# Patient Record
Sex: FEMALE | Race: WHITE | Employment: UNEMPLOYED | URBAN - METROPOLITAN AREA
[De-identification: names, ages, dates, MRNs, and addresses within clinical notes are randomized per-mention and may not be internally consistent; named-entity substitution may affect disease eponyms.]

---

## 2024-10-08 ENCOUNTER — OFFICE VISIT (OUTPATIENT)
Dept: ORTHOPEDIC SURGERY | Age: 59
End: 2024-10-08

## 2024-10-08 DIAGNOSIS — M19.071 ARTHRITIS OF RIGHT ANKLE: Primary | ICD-10-CM

## 2024-10-08 NOTE — PROGRESS NOTES
Name: WILLIAM Rivera  YOB: 1965  Gender: female  MRN: [id number]    Summary: Chronic right ankle arthritis    Intra-articular right ankle injection provided today with cortisone    Follow-up with x-rays of the right ankle to discuss injection results versus prominent medial ankle hardware     CC: right ankle pain    HPI: WILLIAM Rivera is a 58 y.o. female presents today to establish care for workers compensation. Patient has had a series of surgeries performed on this ankle in the past. She is status post remote right ankle lateral ligament reconstruction and peroneus brevis tendon repair in 2007. She has continued pain and swelling in her right ankle that affects her activities of daily living. She has been performing physical therapy off and on for the last several years. She has been on medications, such as Celebrex and Lyrica, as well. She has been established for workers compensation with a provider in [place] but wants to be established locally. She presents today to establish care.     10/8-she continues to have pain over the medial ankle.  She describes sensitivity swelling and pain that limits her.  She primary points with medial ankle also the lateral ankle.  She states she is wearing her brace wearing her inserts with trying to modify her activities but she still has swelling and pain    ROS/Meds/PSH/PMH/FH/SH:   Patient Denies fever/chills, headache, visual changes, chest pain, shortness of breath, and nausea/vomiting/diarrhea     Tobacco:  reports that she has never smoked. She has never used smokeless tobacco.  No results found for: \"LABA1C\"    Physical Examination:  Gen: AAOx3 NAD    right lower:  Moderate edema noted at ankle.  No open wounds.  No signs of infection.  Pain w/ active ROM and passive ROM at the ankle.  TTP at the anterior lateral and medial ankle joint.  There is a mild amount of crepitance noted with ROM that is painful.  FHL/EHL/AT/GS intact.  There is a noted

## 2024-10-24 ENCOUNTER — OFFICE VISIT (OUTPATIENT)
Dept: ORTHOPEDIC SURGERY | Age: 59
End: 2024-10-24

## 2024-10-24 ENCOUNTER — TELEPHONE (OUTPATIENT)
Dept: ORTHOPEDIC SURGERY | Age: 59
End: 2024-10-24

## 2024-10-24 DIAGNOSIS — M19.071 ARTHRITIS OF RIGHT ANKLE: Primary | ICD-10-CM

## 2024-10-24 RX ORDER — CELECOXIB 100 MG/1
100 CAPSULE ORAL DAILY
Qty: 60 CAPSULE | Refills: 1 | Status: SHIPPED | OUTPATIENT
Start: 2024-10-24

## 2024-10-24 NOTE — TELEPHONE ENCOUNTER
They received Celebrex 100mg #60 for one daily. She normally takes bid. They need to know which it should be. Please give her a call.

## 2024-10-24 NOTE — PROGRESS NOTES
Name: WILLIAM Rivera  YOB: 1965  Gender: female  MRN: [id number]    Summary: Chronic right ankle arthritis    A prescription for right ankle AFO with a hinge built-in customized inserts and extra-depth shoes ordered.  Due to her ankle arthritis and the nature of her medial ankle pain with some muscular imbalance I do think an AFO would provide varus and valgus stability while still allowing her to plantarflex and dorsiflex her ankle.  This built into her full-length customized inserts will be imperative to her walking.    Prescriptions for Celebrex and Lyrica.   [facility]erral to [facility] for lower extremity swelling       CC: right ankle pain    HPI: WILLIAM Rivera is a 58 y.o. female presents today to establish care for workers compensation. Patient has had a series of surgeries performed on this ankle in the past. She is status post remote right ankle lateral ligament reconstruction and peroneus brevis tendon repair in 2007. She has continued pain and swelling in her right ankle that affects her activities of daily living. She has been performing physical therapy off and on for the last several years. She has been on medications, such as Celebrex and Lyrica, as well. She has been established for workers compensation with a provider in [place] but wants to be established locally. She presents today to establish care.   10/24-she presents back today with continued medial ankle pain and ankle pain around the foot.  She states her inserts are wearing down.  The last inserts were provided were not made for her and she needs a new prescription.  She wants to be there is anything more the inserts that can help her.  Her main issue is weakness in the ankle, her foot turning in and ankle pain    ROS/Meds/PSH/PMH/FH/SH:   Patient Denies fever/chills, headache, visual changes, chest pain, shortness of breath, and nausea/vomiting/diarrhea     Tobacco:  reports that she has never smoked. She has never

## 2024-12-12 ENCOUNTER — TELEPHONE (OUTPATIENT)
Dept: ORTHOPEDIC SURGERY | Age: 59
End: 2024-12-12

## 2024-12-12 DIAGNOSIS — M54.40 ACUTE RIGHT-SIDED LOW BACK PAIN WITH SCIATICA, SCIATICA LATERALITY UNSPECIFIED: Primary | ICD-10-CM

## 2024-12-12 RX ORDER — METHYLPREDNISOLONE 4 MG/1
TABLET ORAL
Qty: 1 KIT | Refills: 0 | Status: SHIPPED | OUTPATIENT
Start: 2024-12-12 | End: 2024-12-18

## 2024-12-12 RX ORDER — CYCLOBENZAPRINE HCL 10 MG
10 TABLET ORAL 3 TIMES DAILY PRN
Qty: 30 TABLET | Refills: 1 | Status: SHIPPED | OUTPATIENT
Start: 2024-12-12 | End: 2025-01-11

## 2024-12-12 NOTE — TELEPHONE ENCOUNTER
She is asking for a call from Marli. She is having some sciatica issues. I explained she does not treat that but she says its the same leg and foot that Nicole treats so she wants to get her advice.

## 2024-12-17 ENCOUNTER — OFFICE VISIT (OUTPATIENT)
Dept: ORTHOPEDIC SURGERY | Age: 59
End: 2024-12-17
Payer: COMMERCIAL

## 2024-12-17 DIAGNOSIS — M51.361 DEGENERATION OF INTERVERTEBRAL DISC OF LUMBAR REGION WITH LOWER EXTREMITY PAIN: ICD-10-CM

## 2024-12-17 DIAGNOSIS — M47.816 LUMBAR SPONDYLOSIS: Primary | ICD-10-CM

## 2024-12-17 PROCEDURE — 99204 OFFICE O/P NEW MOD 45 MIN: CPT | Performed by: PHYSICIAN ASSISTANT

## 2024-12-17 RX ORDER — GABAPENTIN 300 MG/1
300 CAPSULE ORAL 3 TIMES DAILY
Qty: 90 CAPSULE | Refills: 2 | Status: SHIPPED | OUTPATIENT
Start: 2024-12-17 | End: 2025-03-17

## 2024-12-17 NOTE — PROGRESS NOTES
Name: WILLIAM Rivera  YOB: 1965  Gender: female  MRN: [id number]    CC:   Chief Complaint   Patient presents with    New Patient     Right sided lumbar spine pain with sciatica         HPI:   WILLIAM Rivera is a 59 y.o. female with a PMHx of former diabetic, no longer on medical management, BMI 41, other comorbidities below.      They present here for evaluation of lower back pain rating to the right leg and foot.  This been going on about 10 days.  It may have gotten aggravated after a long driving trip around [place] a few weeks ago, though she states she got out and walked around frequently at frequent stops..  Otherwise no specific injury or trauma at the onset.  But for the last week and a half she has had severe lower back pain rating to the right leg with numbness and tingling in her right foot.  Over the last week and a half or so she has had severe difficulty which is getting around her home and simply driving her car.  She cannot walk long distances because of the pain in her leg and foot.  Her daughter is driving her around because she does not feel confident using the gas pedal with that foot.  She has been taking Flexeril and she takes Celebrex.  She also recently finished an oral steroid taper yesterday, and she has been using ice and heat.  These have provided minimal improvement in her symptoms so far.  She is planning to leave town and go on a long trip in the coming days for the holidays.  She even tried some old hydrocodone from her previous ankle surgery.            Past Medical History Includes:   Past Medical History:   Diagnosis Date    Hypoglycemia     PONV (postoperative nausea and vomiting)    ,   Past Surgical History:   Procedure Laterality Date    ANKLE FRACTURE SURGERY Right 12/13/2023    SURGICAL FIXATION OF RIGHT ANKLE AND FIXATION OF FIFTH METATARSAL AND RIGHT ANKLE TENDON AND LIGAMENT REPAIR performed by Nigel Townsend MD at [facility]    BREAST LUMPECTOMY

## 2024-12-26 ENCOUNTER — TELEPHONE (OUTPATIENT)
Dept: ORTHOPEDIC SURGERY | Age: 59
End: 2024-12-26

## 2024-12-30 ENCOUNTER — TELEPHONE (OUTPATIENT)
Dept: ORTHOPEDIC SURGERY | Age: 59
End: 2024-12-30

## 2024-12-30 NOTE — TELEPHONE ENCOUNTER
She is calling to follow up on her MRI results. I told her I spoke with an MRI tech and was told they are behind again on reports but that hopefully it will be in the next 24 hours.

## 2025-01-03 NOTE — TELEPHONE ENCOUNTER
Called and spoke with pt. Pt was informed her MRI and treatment option will be discuss at her follow up on Monday 01/06/25 with Jose Angel. Pt has not been able to start PT yet. Pt voiced understanding.

## 2025-01-06 ENCOUNTER — OFFICE VISIT (OUTPATIENT)
Dept: ORTHOPEDIC SURGERY | Age: 60
End: 2025-01-06
Payer: COMMERCIAL

## 2025-01-06 VITALS — HEIGHT: 66 IN | BODY MASS INDEX: 40.98 KG/M2 | WEIGHT: 255 LBS

## 2025-01-06 DIAGNOSIS — M47.816 LUMBAR SPONDYLOSIS: Primary | ICD-10-CM

## 2025-01-06 DIAGNOSIS — M51.361 DEGENERATION OF INTERVERTEBRAL DISC OF LUMBAR REGION WITH LOWER EXTREMITY PAIN: ICD-10-CM

## 2025-01-06 PROCEDURE — 99214 OFFICE O/P EST MOD 30 MIN: CPT | Performed by: PHYSICIAN ASSISTANT

## 2025-01-06 NOTE — PROGRESS NOTES
01/06/25        Name: WILLIAM Rivera  YOB: 1965  Gender: female  MRN: [id number]        MRI/IMAGING/STUDY FOLLOWUP    CC: Follow-up (MRI results)       HPI: WILLIAM Rivera is a 59 y.o. female who returns for Follow-up (MRI results)           Patient presents to the office today for follow-up to review MRI results.  She does feel that the gabapentin is helping.          Meds/PSH/PMH/FH/SH: This information has been reviewed.      ALLERGIES:   Allergies   Allergen Reactions    Morphine Nausea And Vomiting              Physical Examination:            Imaging:         MRI Result (most recent):  MRI LUMBAR SPINE WO CONTRAST 12/23/2024    Narrative  MRI LUMBAR SPINE WO CONTRAST    INDICATION: Spondylosis without myelopathy or radiculopathy, lumbar region.  59-year-old female. No lumbar spine surgery reported. No acute trauma reported.    COMPARISON: Lumbar spine radiographs 12/17/2024    TECHNIQUE: Axial and sagittal T1-T2 weighted images along with coronal T2,  sagittal  IR and axial proton density sequences of the lumbar spine were  obtained without infusion of gadolinium.    FINDINGS:  For purposes of this dictation there are 5 nonrib-bearing lumbar vertebral  segments labeled L1-L5. At the vertebral segment just cranial there are  hypoplastic ribs/hyperplastic transverse processes. This is taken to be  T12/transitional segment for purposes of this dictation. Levocurvature. Lumbar  lordosis with trace retrolisthesis L5 on S1. No perched/subluxed facets. No pars  interarticularis defects.    Bone marrow signal intensity shows no acute fracture/acute compression fracture.  No discrete suspicious lytic or blastic lesion    Conus of normal signal intensity and morphology. Tip termination at the  T12/transitional segment level. No acute paraspinal muscle strain. Posterior  subcutaneous lumbar edema, nonspecific. No paravertebral soft tissue mass or  abnormal fluid collection. Question exophytic left renal

## 2025-01-07 ENCOUNTER — OFFICE VISIT (OUTPATIENT)
Dept: ORTHOPEDIC SURGERY | Age: 60
End: 2025-01-07
Payer: COMMERCIAL

## 2025-01-07 DIAGNOSIS — M19.071 ARTHRITIS OF RIGHT ANKLE: Primary | ICD-10-CM

## 2025-01-07 PROCEDURE — 99213 OFFICE O/P EST LOW 20 MIN: CPT | Performed by: PHYSICIAN ASSISTANT

## 2025-01-07 NOTE — PROGRESS NOTES
Name: WILLIAM Rivera  YOB: 1965  Gender: female  MRN: [id number]    Summary: Chronic right ankle arthritis    She will get fitted for her custom brace and inserts with   Continue Celebrex and gabapentin       CC: right ankle pain    HPI: WILLIAM Rivera is a 59 y.o. female presents today to establish care for workers compensation. Patient has had a series of surgeries performed on this ankle in the past. She is status post remote right ankle lateral ligament reconstruction and peroneus brevis tendon repair in 2007. She has continued pain and swelling in her right ankle that affects her activities of daily living. She has been performing physical therapy off and on for the last several years. She has been on medications, such as Celebrex and Lyrica, as well. She has been established for workers compensation with a provider in [place] but wants to be established locally. She presents today to establish care.   10/24-she presents back today with continued medial ankle pain and ankle pain around the foot.  She states her inserts are wearing down.  The last inserts were provided were not made for her and she needs a new prescription.  She wants to be there is anything more the inserts that can help her.  Her main issue is weakness in the ankle, her foot turning in and ankle pain    1/7/2025-patient presents today for repeat evaluation.  She states that she has not gotten her inserts, braces or shoes yet due to some issues with advanced prosthetics.  She would like to use  as that is what she used in [place].  Her main complaint at this time is her lower back with right lower extremity radiculopathy.  She states that she had an MRI and showed a large disc herniation impacting the S1 nerve root.  This is exacerbating her right foot drop.  She is having significant difficulty ambulating because of this.  She has an appointment to see Dr. Francois next week to discuss her

## 2025-01-14 ENCOUNTER — OFFICE VISIT (OUTPATIENT)
Age: 60
End: 2025-01-14
Payer: COMMERCIAL

## 2025-01-14 VITALS — WEIGHT: 256 LBS | BODY MASS INDEX: 41.14 KG/M2 | HEIGHT: 66 IN

## 2025-01-14 DIAGNOSIS — M51.27 HERNIATION OF INTERVERTEBRAL DISC BETWEEN L5 AND S1: Primary | ICD-10-CM

## 2025-01-14 PROCEDURE — 99204 OFFICE O/P NEW MOD 45 MIN: CPT | Performed by: ORTHOPAEDIC SURGERY

## 2025-01-14 NOTE — PROGRESS NOTES
Name: WILLIAM Rivera  YOB: 1965  Gender: female  MRN: [id number]  Age: 59 y.o.    Chief Complaint:   Chief Complaint   Patient presents with    New Patient     Low back pain with right leg numbness          History of Present Illness:      This is a very pleasant 59 y.o. female who presents with a 6-week history of low back and right lower extremity pain.  The patient reports that she had a mechanical fall down some steps in December 2023.  Ultimately this resulted in a broken right foot/ankle requiring surgery with Dr. Nigel portillo.  She has some permanent/persistent right ankle dorsiflexion weakness as a result of this.  She feels like as a result of the immobility and pain from this fall, she consequently developed some chronic lower back pain.  These have been largely well-tolerated until shortly before Thanksgiving, 2024 where she began having severe pain that radiated from the right hip down the right lower extremity.  She describes pain that seems to follow an S1 distribution with posterior buttock, posterior thigh, calf and foot pain.  She also has numbness in this distribution, particularly in the foot.  She has been taking Celebrex without much relief.  Her primary care provider also started her on muscle relaxer.  She recently saw Mr. Vladimir Escamilla and he provided a Medrol Dosepak as well as gabapentin.  She feels like the right leg feels very heavy.  The symptoms are worsening and she has been unable to find any meaningful relief.  She feels like her baseline right ankle weakness is getting slightly worse over the last 6 to 8 weeks.               Social History:  Social History     Tobacco Use    Smoking status: Never    Smokeless tobacco: Never   Substance Use Topics    Alcohol use: Not on file        What is your occupation? retired    Are you disabled? no    Who do you live with?  Spouse and children    Who referred you here?   Jose Angel Escamilla PA      Medications:  Prior to Visit

## 2025-01-16 ENCOUNTER — EVALUATION (OUTPATIENT)
Age: 60
End: 2025-01-16

## 2025-01-16 DIAGNOSIS — M62.81 MUSCLE WEAKNESS (GENERALIZED): ICD-10-CM

## 2025-01-16 DIAGNOSIS — M25.671 ANKLE STIFF, RIGHT: Primary | ICD-10-CM

## 2025-01-16 DIAGNOSIS — R26.2 DIFFICULTY WALKING DUE TO ANKLE AND FOOT JOINT: ICD-10-CM

## 2025-01-16 DIAGNOSIS — M79.604 LEG PAIN, LATERAL, RIGHT: ICD-10-CM

## 2025-01-16 DIAGNOSIS — M54.41 CHRONIC RIGHT-SIDED LOW BACK PAIN WITH RIGHT-SIDED SCIATICA: ICD-10-CM

## 2025-01-16 DIAGNOSIS — M25.571 PAIN IN RIGHT ANKLE AND JOINTS OF RIGHT FOOT: ICD-10-CM

## 2025-01-16 DIAGNOSIS — G89.29 CHRONIC RIGHT-SIDED LOW BACK PAIN WITH RIGHT-SIDED SCIATICA: ICD-10-CM

## 2025-01-16 NOTE — PROGRESS NOTES
flexors, Glut med, max, pelvic rotators, quad, hams  Soft Tissue Tightness: RIGHT > LEFT BILATERAL: Hip flexors, hams, gastroc soleus    AROM:  LUMBAR (within pain tolerance) COMMENTS   FLEX: (40 ° 60°)  25°  Pain and Tightness at EROM   EXT: (20 ° 35 °)  10°  Pain and Tightness at EROM    RIGHT LEFT    Side Bending (15 ° 20°)  10°  15°  Pain and Tightness at EROM   Rotation (3°- 18 ° )  5°  5°  Pain and Tightness at EROM   Thoracic SB  Limited  Limited    Thoracic ROT Limited Limited    Hip External Rot Limited Limited    Hip Internal Rot Limited Limited      GROSS MMT LUMBAR:  LUMBAR/HIP   (within pain tolerance)   COMMENTS   FLEXION 3/5  Pain with resistance   EXTENSION 3/5  Pain with resistance    RIGHT LEFT    SIDE BENDING 3/5 3/5 Pain with resistance   ROTATION 3/5 3/5 Pain with resistance    RIGHT LEFT    Hip EXTENSION 3/5 3/5 Pain with resistance   Hip ABDUCTION 3/5 3/5 Pain with resistance   Hip INTERNAL ROTATION 3/5 3/5 Pain with resistance   HIP EXTERNAL ROTATION 3/5 3/5 Pain with resistance     Palpation/Joint Mobility:   LUMBAR JOINT MOBILITY     COMMENTS   L1 hypomobile    L2 hypomobile       L3 hypomobile    L4 hypomobile    L5 hypomobile    [place] hypomobile    SACRUM  hypomobile    Hypertonic lumbar paraspinals  TTP to lumbosacral paraspinals     DERMATOME/MYOTOME:  WFL  REFLEXES: WNL  Neuro Screen: numbness to R leg , tingling to R leg , and radiating pain R leg (L5-S1 distribution  Bowel/bladder function: normal   Sensory/Abdominal/saddle sensation: normal    Function:   Gait: antalgic R  difficulty/unable to weight shift R  decreased heel strike R  decreased step height/length R  trendelenburg R .   Stair management:step-to leading left ascending, step-to leading right descending Per patient report.  Sit to stand: Guarded, Painful. Compensates with:B UE. Shift weight to LEFT    Special Tests:  SLR's Test:  Positive  Symptoms below 35 degrees: Disc herniation  Heel Walking (L4-L5):

## 2025-01-21 ENCOUNTER — TREATMENT (OUTPATIENT)
Age: 60
End: 2025-01-21
Payer: COMMERCIAL

## 2025-01-21 DIAGNOSIS — M62.81 MUSCLE WEAKNESS (GENERALIZED): ICD-10-CM

## 2025-01-21 DIAGNOSIS — R26.2 DIFFICULTY WALKING DUE TO ANKLE AND FOOT JOINT: ICD-10-CM

## 2025-01-21 DIAGNOSIS — M54.41 CHRONIC RIGHT-SIDED LOW BACK PAIN WITH RIGHT-SIDED SCIATICA: Primary | ICD-10-CM

## 2025-01-21 DIAGNOSIS — G89.29 CHRONIC RIGHT-SIDED LOW BACK PAIN WITH RIGHT-SIDED SCIATICA: Primary | ICD-10-CM

## 2025-01-21 DIAGNOSIS — M79.604 LEG PAIN, LATERAL, RIGHT: ICD-10-CM

## 2025-01-21 PROCEDURE — 97140 MANUAL THERAPY 1/> REGIONS: CPT | Performed by: PHYSICAL THERAPIST

## 2025-01-21 PROCEDURE — 97110 THERAPEUTIC EXERCISES: CPT | Performed by: PHYSICAL THERAPIST

## 2025-01-21 NOTE — PROGRESS NOTES
[facility]  [address] [place] [address]  Dept: [phone]      Physical Therapy Daily Note     Referring MD: Geovani Francois *  Diagnosis:     ICD-10-CM    1. Chronic right-sided low back pain with right-sided sciatica  M54.41     G89.29       2. Leg pain, lateral, right  M79.604       3. Muscle weakness (generalized)  M62.81       4. Difficulty walking due to ankle and foot joint  R26.2            Spine Surgery Date (if applicable):   Prior Spine Surgery: None    Prior Orthopedic Surgeries: Multiple R ankle surgeries   Therapy precautions:Fall risk and Osteopenia/osteoporosis  Co-morbidities affecting plan of care: None    Payor: Payor: [place] [employer] /  /  /     Billing pattern: Commercial- substantial/midpoint time each CPT  Total Timed Procedure Codes: 40 min   Total Time: 40 min    Modifier needed: No  Episode visit count:  2     PERTINENT MEDICAL HISTORY   Medications: reviewed in chart   Past medical and surgical history:   Past Medical History:   Diagnosis Date    Hypoglycemia     PONV (postoperative nausea and vomiting)       Past Surgical History:   Procedure Laterality Date    ANKLE FRACTURE SURGERY Right 12/13/2023    SURGICAL FIXATION OF RIGHT ANKLE AND FIXATION OF FIFTH METATARSAL AND RIGHT ANKLE TENDON AND LIGAMENT REPAIR performed by Nigel Townsend MD at [facility]    BREAST LUMPECTOMY      CHOLECYSTECTOMY      GASTRIC BYPASS SURGERY      HYSTEROPLASTY      TONSILLECTOMY       Allergies:   Allergies   Allergen Reactions    Morphine Nausea And Vomiting      Diagnostic exams (per chart review): MRI of the lumbar spine reviewed from December 23, 2024.  This reveals no high-grade central stenosis.  Visible levels foramina are largely patent on the right side.  On the left side there is similarly no areas of high-grade foraminal stenosis at any level.  There is an extruded disc fragment emanating from the L5-S1 disc space causing displacement of the right traversing

## 2025-01-23 ENCOUNTER — TREATMENT (OUTPATIENT)
Age: 60
End: 2025-01-23

## 2025-01-23 DIAGNOSIS — R26.2 DIFFICULTY WALKING DUE TO ANKLE AND FOOT JOINT: ICD-10-CM

## 2025-01-23 DIAGNOSIS — M25.671 ANKLE STIFF, RIGHT: ICD-10-CM

## 2025-01-23 DIAGNOSIS — M62.81 MUSCLE WEAKNESS (GENERALIZED): ICD-10-CM

## 2025-01-23 DIAGNOSIS — M79.604 LEG PAIN, LATERAL, RIGHT: ICD-10-CM

## 2025-01-23 DIAGNOSIS — M25.571 PAIN IN RIGHT ANKLE AND JOINTS OF RIGHT FOOT: ICD-10-CM

## 2025-01-23 DIAGNOSIS — G89.29 CHRONIC RIGHT-SIDED LOW BACK PAIN WITH RIGHT-SIDED SCIATICA: Primary | ICD-10-CM

## 2025-01-23 DIAGNOSIS — M54.41 CHRONIC RIGHT-SIDED LOW BACK PAIN WITH RIGHT-SIDED SCIATICA: Primary | ICD-10-CM

## 2025-01-23 NOTE — PROGRESS NOTES
[facility] [place] [facility]  [address] [place] [address]  Dept: [phone]      Physical Therapy Daily Note     Referring MD: Geovani Francois *  Diagnosis:     ICD-10-CM    1. Chronic right-sided low back pain with right-sided sciatica  M54.41     G89.29       2. Leg pain, lateral, right  M79.604       3. Muscle weakness (generalized)  M62.81       4. Difficulty walking due to ankle and foot joint  R26.2       5. Pain in right ankle and joints of right foot  M25.571       6. Ankle stiff, right  M25.671          Spine Surgery Date (if applicable):   Prior Spine Surgery: None    Prior Orthopedic Surgeries: Multiple R ankle surgeries   Therapy precautions:Fall risk and Osteopenia/osteoporosis  Co-morbidities affecting plan of care: None    Payor: Payor[place] /  /  /     Billing pattern: Commercial- substantial/midpoint time each CPT  Total Timed Procedure Codes: 40 min   Total Time: 40 min    Modifier needed: No  Episode visit count:  3     PERTINENT MEDICAL HISTORY   Medications: reviewed in chart   Past medical and surgical history:   Past Medical History:   Diagnosis Date    Hypoglycemia     PONV (postoperative nausea and vomiting)       Past Surgical History:   Procedure Laterality Date    ANKLE FRACTURE SURGERY Right 12/13/2023    SURGICAL FIXATION OF RIGHT ANKLE AND FIXATION OF FIFTH METATARSAL AND RIGHT ANKLE TENDON AND LIGAMENT REPAIR performed by Nigel Townsend MD at [facility]    BREAST LUMPECTOMY      CHOLECYSTECTOMY      GASTRIC BYPASS SURGERY      HYSTEROPLASTY      TONSILLECTOMY       Allergies:   Allergies   Allergen Reactions    Morphine Nausea And Vomiting      Diagnostic exams (per chart review): MRI of the lumbar spine reviewed from December 23, 2024.  This reveals no high-grade central stenosis.  Visible levels foramina are largely patent on the right side.  On the left side there is similarly no areas of high-grade foraminal stenosis at any level.  There is an

## 2025-01-24 ENCOUNTER — OFFICE VISIT (OUTPATIENT)
Dept: ORTHOPEDIC SURGERY | Age: 60
End: 2025-01-24

## 2025-01-24 DIAGNOSIS — R52 PAIN: Primary | ICD-10-CM

## 2025-01-24 DIAGNOSIS — G89.18 POST-OP PAIN: ICD-10-CM

## 2025-01-24 NOTE — PROGRESS NOTES
Name: WILLIAM Rivera  YOB: 1965  Gender: female  MRN: [id number]    Summary: Chronic right ankle arthritis    She will get fitted for her custom brace and inserts with     Custom full-length inserts built into a posterior AFO brace and compression stockings ordered.    Continue Celebrex and gabapentin       CC: right ankle pain    HPI: WILLIAM Rivera is a 59 y.o. female presents today to establish care for workers compensation. Patient has had a series of surgeries performed on this ankle in the past. She is status post remote right ankle lateral ligament reconstruction and peroneus brevis tendon repair in 2007. She has continued pain and swelling in her right ankle that affects her activities of daily living. She has been performing physical therapy off and on for the last several years. She has been on medications, such as Celebrex and Lyrica, as well. She has been established for workers compensation with a provider in [place] but wants to be established locally. She presents today to establish care.   10/24-she presents back today with continued medial ankle pain and ankle pain around the foot.  She states her inserts are wearing down.  The last inserts were provided were not made for her and she needs a new prescription.  She wants to be there is anything more the inserts that can help her.  Her main issue is weakness in the ankle, her foot turning in and ankle pain    1/7/2025-patient presents today for repeat evaluation.  She states that she has not gotten her inserts, braces or shoes yet due to some issues with advanced prosthetics.  She would like to use  as that is what she used in [place].  Her main complaint at this time is her lower back with right lower extremity radiculopathy.  She states that she had an MRI and showed a large disc herniation impacting the S1 nerve root.  This is exacerbating her right foot drop.  She is having significant difficulty ambulating because

## 2025-02-17 ENCOUNTER — TELEPHONE (OUTPATIENT)
Dept: ORTHOPEDIC SURGERY | Age: 60
End: 2025-02-17

## 2025-02-17 DIAGNOSIS — G89.18 POST-OP PAIN: ICD-10-CM

## 2025-02-17 DIAGNOSIS — R52 PAIN: Primary | ICD-10-CM

## 2025-02-17 RX ORDER — CELECOXIB 100 MG/1
100 CAPSULE ORAL DAILY
Qty: 60 CAPSULE | Refills: 3 | Status: SHIPPED | OUTPATIENT
Start: 2025-02-17

## 2025-02-18 ENCOUNTER — TELEPHONE (OUTPATIENT)
Dept: ORTHOPEDIC SURGERY | Age: 60
End: 2025-02-18

## 2025-02-18 RX ORDER — CELECOXIB 100 MG/1
100 CAPSULE ORAL DAILY
Qty: 20 CAPSULE | Refills: 0 | OUTPATIENT
Start: 2025-02-18 | End: 2025-03-10

## 2025-02-24 ENCOUNTER — TELEPHONE (OUTPATIENT)
Dept: ORTHOPEDIC SURGERY | Age: 60
End: 2025-02-24

## 2025-02-24 DIAGNOSIS — G89.18 POST-OP PAIN: ICD-10-CM

## 2025-02-24 DIAGNOSIS — R52 PAIN: Primary | ICD-10-CM

## 2025-02-24 RX ORDER — CELECOXIB 100 MG/1
100 CAPSULE ORAL DAILY
Qty: 60 CAPSULE | Refills: 3 | Status: SHIPPED | OUTPATIENT
Start: 2025-02-24

## 2025-02-24 NOTE — TELEPHONE ENCOUNTER
They got a RX for Celebrex 100 take qd but she has been taking bid. Can you change the Rx please.

## 2025-02-25 RX ORDER — CELECOXIB 100 MG/1
100 CAPSULE ORAL 2 TIMES DAILY
Qty: 60 CAPSULE | Refills: 2 | Status: SHIPPED | OUTPATIENT
Start: 2025-02-25

## 2025-02-26 ENCOUNTER — TELEPHONE (OUTPATIENT)
Dept: ORTHOPEDIC SURGERY | Age: 60
End: 2025-02-26

## 2025-02-26 NOTE — TELEPHONE ENCOUNTER
[facility] is calling about the Celebrex. It doesn't match with the instructions and quantity. Please call or resend.

## 2025-03-19 RX ORDER — CELECOXIB 100 MG/1
100 CAPSULE ORAL 2 TIMES DAILY
Qty: 60 CAPSULE | Refills: 1 | Status: SHIPPED | OUTPATIENT
Start: 2025-03-19

## 2025-05-19 ENCOUNTER — TELEPHONE (OUTPATIENT)
Dept: ORTHOPEDIC SURGERY | Age: 60
End: 2025-05-19

## 2025-05-19 DIAGNOSIS — G89.18 POST-OP PAIN: ICD-10-CM

## 2025-05-19 DIAGNOSIS — R52 PAIN: ICD-10-CM

## 2025-05-19 RX ORDER — CELECOXIB 100 MG/1
100 CAPSULE ORAL 2 TIMES DAILY
Qty: 60 CAPSULE | Refills: 3 | Status: SHIPPED | OUTPATIENT
Start: 2025-05-19 | End: 2025-06-18

## 2025-05-27 DIAGNOSIS — G89.18 POST-OP PAIN: ICD-10-CM

## 2025-05-27 DIAGNOSIS — R52 PAIN: Primary | ICD-10-CM

## 2025-05-28 RX ORDER — CELECOXIB 100 MG/1
100 CAPSULE ORAL 2 TIMES DAILY
Qty: 60 CAPSULE | Refills: 3 | Status: SHIPPED | OUTPATIENT
Start: 2025-05-28

## 2025-09-02 RX ORDER — CELECOXIB 100 MG/1
100 CAPSULE ORAL DAILY
Qty: 60 CAPSULE | Refills: 3 | Status: SHIPPED | OUTPATIENT
Start: 2025-09-02

## 2025-09-05 RX ORDER — DICLOFENAC SODIUM 30 MG/G
2 GEL TOPICAL 3 TIMES DAILY PRN
Qty: 100 G | Refills: 0 | Status: SHIPPED | OUTPATIENT
Start: 2025-09-05 | End: 2025-10-03